# Patient Record
Sex: MALE | Race: WHITE
[De-identification: names, ages, dates, MRNs, and addresses within clinical notes are randomized per-mention and may not be internally consistent; named-entity substitution may affect disease eponyms.]

---

## 2018-03-08 ENCOUNTER — HOSPITAL ENCOUNTER (EMERGENCY)
Dept: HOSPITAL 54 - ER | Age: 38
Discharge: HOME | End: 2018-03-08
Payer: MEDICARE

## 2018-03-08 VITALS — WEIGHT: 165 LBS | BODY MASS INDEX: 19.09 KG/M2 | HEIGHT: 78 IN

## 2018-03-08 VITALS — DIASTOLIC BLOOD PRESSURE: 70 MMHG | SYSTOLIC BLOOD PRESSURE: 119 MMHG

## 2018-03-08 DIAGNOSIS — F90.9: ICD-10-CM
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
## 2018-03-08 DIAGNOSIS — F17.200: ICD-10-CM

## 2018-03-08 DIAGNOSIS — M47.9: ICD-10-CM

## 2018-03-08 DIAGNOSIS — S13.4XXA: Primary | ICD-10-CM
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
## 2018-03-08 DIAGNOSIS — F12.10: ICD-10-CM

## 2018-03-08 DIAGNOSIS — S29.012A: ICD-10-CM

## 2018-03-08 DIAGNOSIS — F10.10: ICD-10-CM
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
## 2018-03-08 DIAGNOSIS — M47.892: ICD-10-CM

## 2018-03-08 DIAGNOSIS — F32.9: ICD-10-CM

## 2018-03-08 PROCEDURE — 71045 X-RAY EXAM CHEST 1 VIEW: CPT

## 2018-03-08 PROCEDURE — 72125 CT NECK SPINE W/O DYE: CPT

## 2018-03-08 PROCEDURE — 72128 CT CHEST SPINE W/O DYE: CPT

## 2018-03-08 PROCEDURE — 99284 EMERGENCY DEPT VISIT MOD MDM: CPT

## 2018-03-08 PROCEDURE — 96372 THER/PROPH/DIAG INJ SC/IM: CPT

## 2018-03-08 PROCEDURE — A4606 OXYGEN PROBE USED W OXIMETER: HCPCS

## 2018-03-08 PROCEDURE — Z7610: HCPCS
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
## 2018-03-08 NOTE — NUR
Patient discharged to home in stable condition. Written and verbal after care 
instructions given. Patient verbalizes understanding of instruction. 
Prescription given.